# Patient Record
Sex: FEMALE | Race: WHITE | NOT HISPANIC OR LATINO | Employment: PART TIME | URBAN - NONMETROPOLITAN AREA
[De-identification: names, ages, dates, MRNs, and addresses within clinical notes are randomized per-mention and may not be internally consistent; named-entity substitution may affect disease eponyms.]

---

## 2021-07-30 ENCOUNTER — OFFICE VISIT (OUTPATIENT)
Dept: FAMILY MEDICINE | Facility: OTHER | Age: 21
End: 2021-07-30
Attending: NURSE PRACTITIONER
Payer: COMMERCIAL

## 2021-07-30 VITALS
WEIGHT: 128.9 LBS | DIASTOLIC BLOOD PRESSURE: 84 MMHG | RESPIRATION RATE: 18 BRPM | HEIGHT: 66 IN | SYSTOLIC BLOOD PRESSURE: 132 MMHG | BODY MASS INDEX: 20.72 KG/M2 | HEART RATE: 75 BPM | OXYGEN SATURATION: 98 % | TEMPERATURE: 98.3 F

## 2021-07-30 DIAGNOSIS — J02.9 SORE THROAT: Primary | ICD-10-CM

## 2021-07-30 DIAGNOSIS — J02.0 ACUTE STREPTOCOCCAL PHARYNGITIS: ICD-10-CM

## 2021-07-30 LAB — GROUP A STREP BY PCR: DETECTED

## 2021-07-30 PROCEDURE — 99203 OFFICE O/P NEW LOW 30 MIN: CPT | Performed by: NURSE PRACTITIONER

## 2021-07-30 PROCEDURE — 87651 STREP A DNA AMP PROBE: CPT | Mod: ZL | Performed by: NURSE PRACTITIONER

## 2021-07-30 RX ORDER — AMOXICILLIN 500 MG/1
500 CAPSULE ORAL 2 TIMES DAILY
Qty: 20 CAPSULE | Refills: 0 | Status: SHIPPED | OUTPATIENT
Start: 2021-07-30 | End: 2021-08-09

## 2021-07-30 RX ORDER — LEVETIRACETAM 500 MG/1
TABLET ORAL
COMMUNITY
Start: 2021-06-11

## 2021-07-30 RX ORDER — NORGESTIMATE AND ETHINYL ESTRADIOL 0.25-0.035
1 KIT ORAL DAILY
COMMUNITY
Start: 2021-04-29

## 2021-07-30 ASSESSMENT — MIFFLIN-ST. JEOR: SCORE: 1367.47

## 2021-07-30 ASSESSMENT — PAIN SCALES - GENERAL: PAINLEVEL: NO PAIN (0)

## 2021-07-30 NOTE — PROGRESS NOTES
ASSESSMENT/PLAN:    I have reviewed the nursing notes.  I have reviewed the findings, diagnosis, plan and need for follow up with the patient.     1. Sore throat  - Group A Streptococcus PCR Throat Swab  -Positive strep     2. Acute streptococcal pharyngitis  - amoxicillin (AMOXIL) 500 MG capsule; Take 1 capsule (500 mg) by mouth 2 times daily for 10 days  Dispense: 20 capsule; Refill: 0  -change out tooth brush 24-48 hours into antibiotic treatment to help prevent reinfection  -May use over-the-counter Tylenol or ibuprofen PRN    Discussed warning signs/symptoms indicative of need to f/u    Follow up if symptoms persist or worsen or concerns    I explained my diagnostic considerations and recommendations to the patient, who voiced understanding and agreement with the treatment plan. All questions were answered. We discussed potential side effects of any prescribed or recommended therapies, as well as expectations for response to treatments.    Nancy Dong NP  7/30/2021  12:18 PM    HPI:  Joselin Grayson is a 21 year old female who presents to [facility] today for concerns o sfore throat has been going on since Monday. Last weekend she accidentally used someone else's tooth brush. She also states that she did hook up with someone that she knows and she does have possible concerns regarding oral chlamydia? States she has no reason to believe he has chlamydia but she was on google looking at possible caues of sore throat. Stated last night swelling had gone down but she saw white dots on her uvula. No tonsils, had tonsillectomy years ago. No fever or chills. Otherwise negative review of systems.      History reviewed. No pertinent past medical history.  History reviewed. No pertinent surgical history.  Social History     Tobacco Use     Smoking status: Current Every Day Smoker     Smokeless tobacco: Never Used   Substance Use Topics     Alcohol use: Yes     Current Outpatient Medications   Medication Sig  "Dispense Refill     ESTARYLLA 0.25-35 MG-MCG tablet Take 1 tablet by mouth daily       levETIRAcetam (KEPPRA) 500 MG tablet        Allergies   Allergen Reactions     Cat Hair Extract Hives     Past medical history, past surgical history, current medications and allergies reviewed and accurate to the best of my knowledge.      ROS:  Refer to HPI    /84   Pulse 75   Temp 98.3  F (36.8  C) (Tympanic)   Resp 18   Ht 1.67 m (5' 5.75\")   Wt 58.5 kg (128 lb 14.4 oz)   LMP 07/22/2021   SpO2 98%   BMI 20.96 kg/m      EXAM:  General Appearance: Well appearing 21 year old female, appropriate appearance for age. No acute distress  Ears: Left TM intact, translucent with bony landmarks appreciated, no erythema, no effusion, no bulging, no purulence.  Right TM intact, translucent with bony landmarks appreciated, no erythema, no effusion, no bulging, no purulence.  Left auditory canal clear.  Right auditory canal clear.  Normal external ears, non tender.  Eyes: conjunctivae normal without erythema or irritation, corneas clear, no drainage or crusting, no eyelid swelling, pupils equal   Orophayrnx: No tonsils. Posterior pharynx is erythematous. There is exudate on her uvula. moist mucous membranes, no exudates or petechiae, no post nasal drip seen, no trismus, voice clear.    Sinuses:  No sinus tenderness upon palpation of the frontal or maxillary sinuses  Nose:  Bilateral nares: no erythema, no edema, no drainage or congestion   Neck: supple without adenopathy  Respiratory: normal chest wall and respirations.  Normal effort.  Clear to auscultation bilaterally, no wheezing, crackles or rhonchi.  No increased work of breathing.  No cough appreciated.  Cardiac: RRR with no murmurs  Dermatological: no rashes noted of exposed skin  Psychological: normal affect, alert, oriented, and pleasant.     "

## 2021-07-30 NOTE — NURSING NOTE
Chief Complaint   Patient presents with     Pharyngitis     Sore throat has been going on since Monday. Last weekend she accidentally used someone else's tooth brush. She states that she did hook up with someone that she knows and she does have possible concerns regarding oral chlamydia.     Initial There were no vitals taken for this visit. There is no height or weight on file to calculate BMI.     FOOD SECURITY SCREENING QUESTIONS  Hunger Vital Signs:  Within the past 12 months we worried whether our food would run out before we got money to buy more. Never  Within the past 12 months the food we bought just didn't last and we didn't have money to get more. Never      Medication Reconciliation: Complete      Tobias Dickens LPN